# Patient Record
Sex: MALE | Race: WHITE
[De-identification: names, ages, dates, MRNs, and addresses within clinical notes are randomized per-mention and may not be internally consistent; named-entity substitution may affect disease eponyms.]

---

## 2022-01-02 ENCOUNTER — HOSPITAL ENCOUNTER (EMERGENCY)
Dept: HOSPITAL 74 - JER | Age: 67
Discharge: HOME | End: 2022-01-02
Payer: COMMERCIAL

## 2022-01-02 VITALS — DIASTOLIC BLOOD PRESSURE: 64 MMHG | HEART RATE: 66 BPM | SYSTOLIC BLOOD PRESSURE: 119 MMHG

## 2022-01-02 VITALS — BODY MASS INDEX: 27 KG/M2

## 2022-01-02 DIAGNOSIS — Z91.14: ICD-10-CM

## 2022-01-02 DIAGNOSIS — R56.9: Primary | ICD-10-CM

## 2022-01-02 LAB
ALBUMIN SERPL-MCNC: 3.4 G/DL (ref 3.4–5)
ALP SERPL-CCNC: 125 U/L (ref 45–117)
ALT SERPL-CCNC: 17 U/L (ref 13–61)
ANION GAP SERPL CALC-SCNC: 10 MMOL/L (ref 8–16)
AST SERPL-CCNC: 15 U/L (ref 15–37)
BASOPHILS # BLD: 0.4 % (ref 0–2)
BILIRUB SERPL-MCNC: 0.8 MG/DL (ref 0.2–1)
BUN SERPL-MCNC: 47.5 MG/DL (ref 7–18)
CALCIUM SERPL-MCNC: 8.2 MG/DL (ref 8.5–10.1)
CHLORIDE SERPL-SCNC: 104 MMOL/L (ref 98–107)
CO2 SERPL-SCNC: 24 MMOL/L (ref 21–32)
CREAT SERPL-MCNC: 5.6 MG/DL (ref 0.55–1.3)
DEPRECATED RDW RBC AUTO: 14.6 % (ref 11.9–15.9)
EOSINOPHIL # BLD: 1.8 % (ref 0–4.5)
GLUCOSE SERPL-MCNC: 116 MG/DL (ref 74–106)
HCT VFR BLD CALC: 30.4 % (ref 35.4–49)
HGB BLD-MCNC: 10.2 GM/DL (ref 11.7–16.9)
LYMPHOCYTES # BLD: 10 % (ref 8–40)
MCH RBC QN AUTO: 31.3 PG (ref 25.7–33.7)
MCHC RBC AUTO-ENTMCNC: 33.5 G/DL (ref 32–35.9)
MCV RBC: 93.4 FL (ref 80–96)
MONOCYTES # BLD AUTO: 5.6 % (ref 3.8–10.2)
NEUTROPHILS # BLD: 82.2 % (ref 42.8–82.8)
PLATELET # BLD AUTO: 99 10^3/UL (ref 134–434)
PMV BLD: 8.4 FL (ref 7.5–11.1)
PROT SERPL-MCNC: 6.4 G/DL (ref 6.4–8.2)
RBC # BLD AUTO: 3.26 M/MM3 (ref 4–5.6)
SODIUM SERPL-SCNC: 138 MMOL/L (ref 136–145)
WBC # BLD AUTO: 4.7 K/MM3 (ref 4–10)

## 2022-01-02 PROCEDURE — 3E033GC INTRODUCTION OF OTHER THERAPEUTIC SUBSTANCE INTO PERIPHERAL VEIN, PERCUTANEOUS APPROACH: ICD-10-PCS

## 2024-05-04 PROBLEM — Z00.00 ENCOUNTER FOR PREVENTIVE HEALTH EXAMINATION: Status: ACTIVE | Noted: 2024-05-04

## 2024-05-06 ENCOUNTER — RESULT REVIEW (OUTPATIENT)
Age: 69
End: 2024-05-06

## 2024-05-06 ENCOUNTER — TRANSCRIPTION ENCOUNTER (OUTPATIENT)
Age: 69
End: 2024-05-06

## 2024-05-06 ENCOUNTER — APPOINTMENT (OUTPATIENT)
Dept: HEMATOLOGY ONCOLOGY | Facility: CLINIC | Age: 69
End: 2024-05-06
Payer: MEDICARE

## 2024-05-06 VITALS
HEART RATE: 66 BPM | TEMPERATURE: 97.1 F | WEIGHT: 201 LBS | RESPIRATION RATE: 16 BRPM | OXYGEN SATURATION: 98 % | BODY MASS INDEX: 25.8 KG/M2 | SYSTOLIC BLOOD PRESSURE: 125 MMHG | DIASTOLIC BLOOD PRESSURE: 70 MMHG | HEIGHT: 74 IN

## 2024-05-06 DIAGNOSIS — Z86.79 PERSONAL HISTORY OF OTHER DISEASES OF THE CIRCULATORY SYSTEM: ICD-10-CM

## 2024-05-06 DIAGNOSIS — Z80.0 FAMILY HISTORY OF MALIGNANT NEOPLASM OF DIGESTIVE ORGANS: ICD-10-CM

## 2024-05-06 DIAGNOSIS — Z99.2 END STAGE RENAL DISEASE: ICD-10-CM

## 2024-05-06 DIAGNOSIS — D49.6 NEOPLASM OF UNSPECIFIED BEHAVIOR OF BRAIN: ICD-10-CM

## 2024-05-06 DIAGNOSIS — Z78.9 OTHER SPECIFIED HEALTH STATUS: ICD-10-CM

## 2024-05-06 DIAGNOSIS — N19 UNSPECIFIED KIDNEY FAILURE: ICD-10-CM

## 2024-05-06 DIAGNOSIS — C70.9 MALIGNANT NEOPLASM OF MENINGES, UNSPECIFIED: ICD-10-CM

## 2024-05-06 DIAGNOSIS — R53.83 OTHER FATIGUE: ICD-10-CM

## 2024-05-06 DIAGNOSIS — Z99.2 DEPENDENCE ON RENAL DIALYSIS: ICD-10-CM

## 2024-05-06 DIAGNOSIS — R56.9 UNSPECIFIED CONVULSIONS: ICD-10-CM

## 2024-05-06 DIAGNOSIS — Z86.39 PERSONAL HISTORY OF OTHER ENDOCRINE, NUTRITIONAL AND METABOLIC DISEASE: ICD-10-CM

## 2024-05-06 DIAGNOSIS — N18.6 END STAGE RENAL DISEASE: ICD-10-CM

## 2024-05-06 DIAGNOSIS — I48.0 PAROXYSMAL ATRIAL FIBRILLATION: ICD-10-CM

## 2024-05-06 PROCEDURE — 99205 OFFICE O/P NEW HI 60 MIN: CPT

## 2024-05-06 PROCEDURE — 36415 COLL VENOUS BLD VENIPUNCTURE: CPT

## 2024-05-06 RX ORDER — ALLOPURINOL 100 MG/1
100 TABLET ORAL
Refills: 0 | Status: ACTIVE | COMMUNITY

## 2024-05-06 RX ORDER — SEVELAMER CARBONATE 2400 MG/1
2.4 POWDER, FOR SUSPENSION ORAL
Refills: 0 | Status: ACTIVE | COMMUNITY

## 2024-05-06 RX ORDER — LEVETIRACETAM 500 MG/1
500 TABLET, FILM COATED ORAL
Refills: 0 | Status: ACTIVE | COMMUNITY

## 2024-05-06 RX ORDER — APIXABAN 2.5 MG/1
2.5 TABLET, FILM COATED ORAL
Refills: 0 | Status: ACTIVE | COMMUNITY

## 2024-05-06 RX ORDER — ISOSORBIDE MONONITRATE 60 MG/1
60 TABLET, EXTENDED RELEASE ORAL
Refills: 0 | Status: ACTIVE | COMMUNITY

## 2024-05-06 RX ORDER — DOXAZOSIN 4 MG/1
4 TABLET ORAL
Refills: 0 | Status: ACTIVE | COMMUNITY

## 2024-05-06 RX ORDER — HYDRALAZINE HYDROCHLORIDE 100 MG/1
100 TABLET ORAL
Refills: 0 | Status: ACTIVE | COMMUNITY

## 2024-05-06 RX ORDER — CARVEDILOL 25 MG/1
25 TABLET, FILM COATED ORAL
Refills: 0 | Status: ACTIVE | COMMUNITY

## 2024-05-06 RX ORDER — LEVETIRACETAM 750 MG/1
750 TABLET, FILM COATED ORAL
Refills: 0 | Status: ACTIVE | COMMUNITY

## 2024-05-06 RX ORDER — ATORVASTATIN CALCIUM 80 MG/1
80 TABLET, FILM COATED ORAL
Refills: 0 | Status: ACTIVE | COMMUNITY

## 2024-05-07 PROBLEM — R53.83 OTHER FATIGUE: Status: ACTIVE | Noted: 2024-05-07

## 2024-05-07 PROBLEM — D49.6 BRAIN TUMOR: Status: ACTIVE | Noted: 2024-05-06

## 2024-05-07 PROBLEM — N19 KIDNEY FAILURE: Status: ACTIVE | Noted: 2024-05-06

## 2024-05-07 PROBLEM — C70.9: Status: ACTIVE | Noted: 2024-05-07

## 2024-05-07 PROBLEM — N18.6 ESRD (END STAGE RENAL DISEASE) ON DIALYSIS: Status: ACTIVE | Noted: 2024-05-07

## 2024-05-07 NOTE — HISTORY OF PRESENT ILLNESS
[Disease: _____________________] : Disease: [unfilled] [de-identified] : Mr.Richard Edwards is a 68-year-old [place] male who presents to the office for hx of Left frontal anaplastic meningioma.  He follows with Dr. Casey Ochoa (neurologist, q 3 months.)  He has been on dialysis x 6 years Tues, Thur, and Saturday ([facility])- nephrologist Dr. Felicitas Johnson s/p resection 21 by   s/p RT VMAT 60Gy/30fx 21-21 by  ([facility])  In 2021: the patient was having persistent headaches along with pailledema which was identified by ophthalmology; he was sent to [facility] emergency room where they found that the patient had a large left frontal extra-axial mass. He was then transferred to [place] and underwent a resection by Dr. Barkley on 21. Intraoperatively the tumor was found to be extremely vascular in nature with multiple arterial feeders; good plans with brain posteriorly though was invading bernie along anterior/deep margins; the vessels at the time that were feeding the tumor were cut/cauterized along with a debulking/extra-capsular dissection occurred.   Pathology revealed: Anaplastic meningioma; WHO grade III: 24mits/10 hpf with multiple foci brain invasion;  Focally positive ME+, CK+, CK19+/Negative: STAT 6   21 post op MRI: showed a small region of enhancement along L interolateral aspects of the resection cavity possible small residuals.  21-21: Underwent RT VMAT 60Gy/30fx by   21: post radiation MRI: stable  21: first time generalized tonic clonic seizure: placed on Keppra 500mg BID   11/10/21: Fall in bathroom from confusion 21: f/u MRI post fall: stable 22: seizure at dialysis  22: f/u MRI stable  22, -: instance of difficult enunciating speech,  22: f/u MRI stable/negative of disease recurrence 22: MRI stable 10/21/22: MRI: small L frontal region of diffusion restriction with faint internal enhancement, faint internal enhance (acute infarct); no evidence of worsening in the enhancement along tumor resection area.   Follow up MRI dates: 22, 23, 3/30/23, 23, 23, 23: which were stable with possible post RT changes  3/4/24: MRI: newly conspicuous more anterior focus of enhancements in parenchyma, again favored to represent evolving post-RT change, remainder of inflammation overall stable to slightly improved. No evidence of disease recurrence.   Past hx: CAD, PAF, A.fib, left frontal anaplastic meningioma, DM2, dialysis, seizures  Social hx: Smoker: none ETOH: Denies Illicit Drugs: Denies Work: Retired [employer]  Family Hx: Father and brother  from stomach cancer

## 2024-05-07 NOTE — CONSULT LETTER
[Dear  ___] : Dear  [unfilled], [Consult Letter:] : I had the pleasure of evaluating your patient, [unfilled]. [Please see my note below.] : Please see my note below. [Consult Closing:] : Thank you very much for allowing me to participate in the care of this patient.  If you have any questions, please do not hesitate to contact me. [Sincerely,] : Sincerely, [FreeTextEntry3] : Tiny Gillis MD  of Medicine [facility] of Medicine [facility]

## 2024-05-07 NOTE — REASON FOR VISIT
[Initial Consultation] : an initial consultation [Family Member] : family member [FreeTextEntry2] : anaplastic meningioma, thrombocytopenia

## 2024-05-07 NOTE — ASSESSMENT
[FreeTextEntry1] : Mr.Richard Edwards is a 68-year-old [place] male who presents to the office for hx of Left frontal anaplastic meningioma.  He follows with Dr. Casey Ochoa (neurologist, q 3 months.)  He has been on dialysis x 6 years Tues, Thur, and Saturday ([facility])- nephrologist Dr. Felicitas Johnson s/p resection 6/2/21 by   s/p RT VMAT 60Gy/30fx 7/6/21-8/17/21 by  ([facility])  # Fatigue and generalized weakness - evaluate for radiation necrosis, MRI of the brain with perfusion   - check testosterone level  # Anemia - Hg 10.6, ESRD contributing, patient on iron and Epo with dialysis. - evaluate for nutritional deficiency  # Thrombocytopenia. Reviewed with daughter records, chronic process, no history of bleeding.  Patient never had BM biopsy. Today platelets count 84k- check ifex, immunoglobins.  - plt 4/1/24- 98k - 2022- 89k - 9/2021- 98k - 6/21- 118k - 6/2/21- 128k - 9/4/20- 103k  - 2/2020- 102k - 8/2019- 82k - 12/2018- 135k    PCP - Dr Bradley Ng - [facility] [address] [place] [address]

## 2024-05-10 ENCOUNTER — TRANSCRIPTION ENCOUNTER (OUTPATIENT)
Age: 69
End: 2024-05-10

## 2024-05-23 ENCOUNTER — RESULT REVIEW (OUTPATIENT)
Age: 69
End: 2024-05-23

## 2024-05-23 ENCOUNTER — APPOINTMENT (OUTPATIENT)
Dept: HEMATOLOGY ONCOLOGY | Facility: CLINIC | Age: 69
End: 2024-05-23
Payer: MEDICARE

## 2024-05-23 VITALS
SYSTOLIC BLOOD PRESSURE: 98 MMHG | HEART RATE: 69 BPM | OXYGEN SATURATION: 98 % | TEMPERATURE: 97 F | RESPIRATION RATE: 17 BRPM | DIASTOLIC BLOOD PRESSURE: 52 MMHG | HEIGHT: 74 IN | WEIGHT: 195.56 LBS | BODY MASS INDEX: 25.1 KG/M2

## 2024-05-23 DIAGNOSIS — D63.1 END STAGE RENAL DISEASE: ICD-10-CM

## 2024-05-23 DIAGNOSIS — N18.6 END STAGE RENAL DISEASE: ICD-10-CM

## 2024-05-23 DIAGNOSIS — D69.6 THROMBOCYTOPENIA, UNSPECIFIED: ICD-10-CM

## 2024-05-23 DIAGNOSIS — D63.8 ANEMIA IN OTHER CHRONIC DISEASES CLASSIFIED ELSEWHERE: ICD-10-CM

## 2024-05-23 PROCEDURE — 99213 OFFICE O/P EST LOW 20 MIN: CPT

## 2024-05-23 PROCEDURE — 36415 COLL VENOUS BLD VENIPUNCTURE: CPT

## 2024-05-30 ENCOUNTER — TRANSCRIPTION ENCOUNTER (OUTPATIENT)
Age: 69
End: 2024-05-30

## 2024-05-31 ENCOUNTER — APPOINTMENT (OUTPATIENT)
Dept: HEMATOLOGY ONCOLOGY | Facility: CLINIC | Age: 69
End: 2024-05-31

## 2024-06-04 PROBLEM — N18.6 ESRD WITH ANEMIA: Status: ACTIVE | Noted: 2024-05-07

## 2024-06-04 PROBLEM — D63.8 ANEMIA DUE TO CHRONIC ILLNESS: Status: ACTIVE | Noted: 2024-05-07

## 2024-06-04 NOTE — HISTORY OF PRESENT ILLNESS
[Disease: _____________________] : Disease: [unfilled] [de-identified] : Mr.Richard Edwards is a 68-year-old [place] male who presents to the office for hx of Left frontal anaplastic meningioma.  He follows with Dr. Casey Ochoa (neurologist, q 3 months.)  He has been on dialysis x 6 years Tues, Thur, and Saturday ([facility])- nephrologist Dr. Felicitas Johnson s/p resection 21 by   s/p RT VMAT 60Gy/30fx 21-21 by  ([facility])  In 2021: the patient was having persistent headaches along with pailledema which was identified by ophthalmology; he was sent to [facility] emergency room where they found that the patient had a large left frontal extra-axial mass. He was then transferred to [place] and underwent a resection by Dr. Barkley on 21. Intraoperatively the tumor was found to be extremely vascular in nature with multiple arterial feeders; good plans with brain posteriorly though was invading bernie along anterior/deep margins; the vessels at the time that were feeding the tumor were cut/cauterized along with a debulking/extra-capsular dissection occurred.   Pathology revealed: Anaplastic meningioma; WHO grade III: 24mits/10 hpf with multiple foci brain invasion;  Focally positive WA+, CK+, CK19+/Negative: STAT 6   21 post op MRI: showed a small region of enhancement along L interolateral aspects of the resection cavity possible small residuals.  21-21: Underwent RT VMAT 60Gy/30fx by   21: post radiation MRI: stable  21: first time generalized tonic clonic seizure: placed on Keppra 500mg BID   11/10/21: Fall in bathroom from confusion 21: f/u MRI post fall: stable 22: seizure at dialysis  22: f/u MRI stable  22, -: instance of difficult enunciating speech,  22: f/u MRI stable/negative of disease recurrence 22: MRI stable 10/21/22: MRI: small L frontal region of diffusion restriction with faint internal enhancement, faint internal enhance (acute infarct); no evidence of worsening in the enhancement along tumor resection area.   Follow up MRI dates: 22, 23, 3/30/23, 23, 23, 23: which were stable with possible post RT changes  3/4/24: MRI: newly conspicuous more anterior focus of enhancements in parenchyma, again favored to represent evolving post-RT change, remainder of inflammation overall stable to slightly improved. No evidence of disease recurrence.   Past hx: CAD, PAF, A.fib, left frontal anaplastic meningioma, DM2, dialysis, seizures  Social hx: Smoker: none ETOH: Denies Illicit Drugs: Denies Work: Retired [employer]  Family Hx: Father and brother  from stomach cancer [de-identified] : Patient is here for follow up for anaplastic meningioma, thrombocytopenia. He is currently on

## 2024-06-04 NOTE — ASSESSMENT
[FreeTextEntry1] : Mr.Richard Edwards is a 68-year-old [place] male who presents to the office for hx of Left frontal anaplastic meningioma.  He follows with Dr. Casey Ochoa (neurologist, q 3 months.)  He has been on dialysis x 6 years Tues, Thur, and Saturday ([facility])- nephrologist Dr. Felicitas Johnson s/p resection 6/2/21 by   s/p RT VMAT 60Gy/30fx 7/6/21-8/17/21 by  ([facility])  # Fatigue and generalized weakness - evaluate for radiation necrosis, MRI of the brain with perfusion   - check testosterone level  # Anemia - Hg 10.6, ESRD contributing, patient on iron and Epo with dialysis. - evaluate for nutritional deficiency - started on Mircera  # Thrombocytopenia. Reviewed with daughter records, chronic process, no history of bleeding.  Patient never had BM biopsy. Today platelets count 84k- check ifex, immunoglobins.  - plt 4/1/24- 98k - 2022- 89k - 9/2021- 98k - 6/21- 118k - 6/2/21- 128k - 9/4/20- 103k  - 2/2020- 102k - 8/2019- 82k - 12/2018- 135k Reviewed labs - platelets count 94 from lavender tube, plt count on blue top -67. Reviewed that if platelets count declining jen proceed with bone marrow biopsy   ESRD- on HD PCP - Dr Bradley Leonard- [facility] [address] [place] [address]

## 2024-06-04 NOTE — CONSULT LETTER
[Dear  ___] : Dear  [unfilled], [Consult Letter:] : I had the pleasure of evaluating your patient, [unfilled]. [Please see my note below.] : Please see my note below. [Consult Closing:] : Thank you very much for allowing me to participate in the care of this patient.  If you have any questions, please do not hesitate to contact me. [Sincerely,] : Sincerely, [FreeTextEntry3] : Tiny Gillis MD  of Medicine [facility] of Medicine [facility]

## 2024-06-04 NOTE — REASON FOR VISIT
[Initial Consultation] : an initial consultation [Family Member] : family member [Follow-Up Visit] : a follow-up [FreeTextEntry2] : anaplastic meningioma, thrombocytopenia

## 2024-06-24 ENCOUNTER — APPOINTMENT (OUTPATIENT)
Dept: HEMATOLOGY ONCOLOGY | Facility: CLINIC | Age: 69
End: 2024-06-24

## 2024-08-07 ENCOUNTER — NON-APPOINTMENT (OUTPATIENT)
Age: 69
End: 2024-08-07